# Patient Record
Sex: MALE | Race: WHITE | Employment: FULL TIME | URBAN - METROPOLITAN AREA
[De-identification: names, ages, dates, MRNs, and addresses within clinical notes are randomized per-mention and may not be internally consistent; named-entity substitution may affect disease eponyms.]

---

## 2017-10-17 ENCOUNTER — OFFICE VISIT (OUTPATIENT)
Dept: UROLOGY | Facility: CLINIC | Age: 57
End: 2017-10-17
Payer: COMMERCIAL

## 2017-10-17 ENCOUNTER — TELEPHONE (OUTPATIENT)
Dept: UROLOGY | Facility: CLINIC | Age: 57
End: 2017-10-17

## 2017-10-17 DIAGNOSIS — N41.0 ACUTE PROSTATITIS: Primary | ICD-10-CM

## 2017-10-17 PROCEDURE — 99214 OFFICE O/P EST MOD 30 MIN: CPT | Mod: 25 | Performed by: UROLOGY

## 2017-10-17 PROCEDURE — 51798 US URINE CAPACITY MEASURE: CPT | Performed by: UROLOGY

## 2017-10-17 RX ORDER — CIPROFLOXACIN 500 MG/1
500 TABLET, FILM COATED ORAL 2 TIMES DAILY
Qty: 60 TABLET | Refills: 0 | Status: SHIPPED | OUTPATIENT
Start: 2017-10-17 | End: 2018-11-28

## 2017-10-17 NOTE — PROGRESS NOTES
Chief Complaint   Patient presents with     Benign Prostatic Hypertrophy       Deandre Tao is a 57 year old male who presents today for follow up of   Chief Complaint   Patient presents with     Benign Prostatic Hypertrophy    patient c/o frequency, dysuria, low grade fever for the last several days.  He was seen by his primary and received cipro for the last several days.  His UA showed many wbc/hpf but urine culture was not done.  Today's UA however looked normal.  He has h/o BPH and has been on flomax for a long time.   He had bladder scan today that showed residual urine of 50 ml.    Current Outpatient Prescriptions   Medication Sig Dispense Refill     ciprofloxacin (CIPRO) 500 MG tablet Take 1 tablet (500 mg) by mouth 2 times daily 60 tablet 0     tamsulosin (FLOMAX) 0.4 MG 24 hr capsule Take 1 capsule (0.4 mg) by mouth daily 90 capsule 3     sildenafil (VIAGRA) 100 MG tablet Take 1 tablet (100 mg) by mouth daily as needed for erectile dysfunction 6 tablet 3     clopidogrel (PLAVIX) 75 MG tablet Take 75 mg by mouth       fluticasone (FLONASE) 50 MCG/ACT nasal spray INHALE 1 SPRAY IN EACH NOSTRIL EVERY DAY.       omeprazole (PRILOSEC) 20 MG capsule Take 20 mg by mouth       nitroglycerin (NITROSTAT) 0.4 MG SL tablet        order for DME Auto CPAP 6 cmH20 1 Units 0     tadalafil (CIALIS) 5 MG tablet Take 1 tablet (5 mg) by mouth as needed for erectile dysfunction 30 tablet 0     ATORVASTATIN CALCIUM PO Take 20 mg by mouth       aspirin 81 MG tablet Take 1 tablet by mouth daily Take one tablet daily       propranolol (INDERAL) 40 MG tablet Take 40 mg by mouth 2 times daily Take 40 mg twice daily       fenofibrate 54 MG tablet Take 54 mg by mouth daily Take one tablet daily       buPROPion (WELLBUTRIN XL) 300 MG 24 hr tablet Take 300 mg by mouth every morning.       Allergies   Allergen Reactions     Dust Mites Shortness Of Breath     Feathers Shortness Of Breath      No past medical history on file.  Past  Surgical History:   Procedure Laterality Date     HEART CATH PFO CLOSURE  2011     ORTHOPEDIC SURGERY  2009    arthroscopy right knee     Family History   Problem Relation Age of Onset     DIABETES Father      DIABETES Brother      Coronary Artery Disease No family hx of      Colon Cancer No family hx of      Social History     Social History     Marital status:      Spouse name: N/A     Number of children: N/A     Years of education: N/A     Occupational History       [employer]     Social History Main Topics     Smoking status: Never Smoker     Smokeless tobacco: Never Used     Alcohol use No      Comment: very little     Drug use: No     Sexual activity: Not Asked     Other Topics Concern     None     Social History Narrative       REVIEW OF SYSTEMS  =================  C: NEGATIVE for fever, chills, change in weight  I: NEGATIVE for worrisome rashes, moles or lesions  E/M: NEGATIVE for ear, mouth and throat problems  R: NEGATIVE for significant cough or SHORTNESS OF BREATH,   CV: NEGATIVE for chest pain, palpitations or peripheral edema  GI: NEGATIVE for nausea, abdominal pain, heartburn, or change in bowel habits  NEURO: NEGATIVE any motor/sensory changes  PSYCH: NEGATIVE for recent mood disorder    Physical Exam:  There were no vitals taken for this visit.   Patient is pleasant, in no acute distress, good general condition.  Lung: no evidence of respiratory distress    Abdomen: Soft, nondistended, non tender. No masses. No rebound or guarding.   Exam: no cvca tenderness  Skin: Warm and dry.  No redness.  Psych: normal mood and affect  Neuro: alert and oriented    Assessment/Plan:   (N41.0) Acute prostatitis  (primary encounter diagnosis)  Comment:    Plan: discussed pathophysiology of infection           Cont with cipro for one more month           Cont with flomax            Ok to take ibuprofen            Discussed Azo to help with symptoms           F/u in one month for re-exam             Discussed probiotics while on cipro

## 2017-10-17 NOTE — MR AVS SNAPSHOT
After Visit Summary   10/17/2017    Deandre Tao    MRN: [id number]           Patient Information     Date Of Birth          1960        Visit Information        Provider Department      10/17/2017 2:30 PM He Hensley MD [facility]        Today's Diagnoses     Acute prostatitis    -  1       Follow-ups after your visit        Who to contact     If you have questions or need follow up information about today's clinic visit or your schedule please contact [facility] directly at [phone].  Normal or non-critical lab and imaging results will be communicated to you by [employer]shart, letter or phone within 4 business days after the clinic has received the results. If you do not hear from us within 7 days, please contact the clinic through TrekkSoftt or phone. If you have a critical or abnormal lab result, we will notify you by phone as soon as possible.  Submit refill requests through RMI or call your pharmacy and they will forward the refill request to us. Please allow 3 business days for your refill to be completed.          Additional Information About Your Visit        MyChart Information     RMI gives you secure access to your electronic health record. If you see a primary care provider, you can also send messages to your care team and make appointments. If you have questions, please call your primary care clinic.  If you do not have a primary care provider, please call [phone] and they will assist you.        Care EveryWhere ID     This is your Care EveryWhere ID. This could be used by other organizations to access your [place] medical records  [id number]         Blood Pressure from Last 3 Encounters:   10/25/16 118/72   10/04/16 131/85   09/02/15 136/85    Weight from Last 3 Encounters:   10/04/16 89.8 kg (198 lb)   09/02/15 95.3 kg (210 lb)   07/22/14 92.4 kg (203 lb 12.8 oz)              Today, you had the following     No orders found for  display         Today's Medication Changes          These changes are accurate as of: 10/17/17  2:56 PM.  If you have any questions, ask your nurse or doctor.               Start taking these medicines.        Dose/Directions    ciprofloxacin 500 MG tablet   Commonly known as:  CIPRO   Used for:  Acute prostatitis   Started by:  He Hensley MD        Dose:  500 mg   Take 1 tablet (500 mg) by mouth 2 times daily   Quantity:  60 tablet   Refills:  0            Where to get your medicines      These medications were sent [facility] [email or web address] [address] [address]  [address] [facility] [address]    Hours:  24-hours Phone:  [phone]     ciprofloxacin 500 MG tablet                Primary Care Provider Office Phone # Fax #    [place] [facility] [phone] [address] [facility] [address]        Equal Access to Services     KIA IRWIN : Hadii graham crenshaw hadasho Soomaali, waaxda luqadaha, qaybta kaalmada adeegyada, waxay sandrain haycamillan cheikh wyatt . So [facility] [phone].    ATENCIÓN: Si habla español, tiene a kenny disposición servicios gratuitos de asistencia lingüística. [facility] [phone].    We comply with applicable federal civil rights laws and [place] laws. We do not discriminate on the basis of race, color, national origin, age, disability, sex, sexual orientation, or gender identity.            Thank you!     Thank you for choosing [facility]  for your care. Our goal is always to provide you with excellent care. Hearing back from our patients is one way we can continue to improve our services. Please take a few minutes to complete the written survey that you may receive in the mail after your visit with us. Thank you!             Your Updated Medication List - Protect others around you: Learn how to safely use, store and throw away your medicines at  www.disposemymeds.org.          This list is accurate as of: 10/17/17  2:56 PM.  Always use your most recent med list.                   Brand Name Dispense Instructions for use Diagnosis    aspirin 81 MG tablet      Take 1 tablet by mouth daily Take one tablet daily        ATORVASTATIN CALCIUM PO      Take 20 mg by mouth        buPROPion 300 MG 24 hr tablet    WELLBUTRIN XL     Take 300 mg by mouth every morning.        ciprofloxacin 500 MG tablet    CIPRO    60 tablet    Take 1 tablet (500 mg) by mouth 2 times daily    Acute prostatitis       clopidogrel 75 MG tablet    PLAVIX     Take 75 mg by mouth        fenofibrate 54 MG tablet      Take 54 mg by mouth daily Take one tablet daily        fluticasone 50 MCG/ACT spray    FLONASE     INHALE 1 SPRAY IN EACH NOSTRIL EVERY DAY.        NITROSTAT 0.4 MG sublingual tablet   Generic drug:  nitroGLYcerin           omeprazole 20 MG CR capsule    priLOSEC     Take 20 mg by mouth        order for DME     1 Units    Auto CPAP 6 cmH20    GIANLUCA (obstructive sleep apnea)       propranolol 40 MG tablet    INDERAL     Take 40 mg by mouth 2 times daily Take 40 mg twice daily        sildenafil 100 MG tablet    VIAGRA    6 tablet    Take 1 tablet (100 mg) by mouth daily as needed for erectile dysfunction    Benign non-nodular prostatic hyperplasia with lower urinary tract symptoms       tadalafil 5 MG tablet    CIALIS    30 tablet    Take 1 tablet (5 mg) by mouth as needed for erectile dysfunction    Male impotence       tamsulosin 0.4 MG capsule    FLOMAX    90 capsule    Take 1 capsule (0.4 mg) by mouth daily    Benign non-nodular prostatic hyperplasia with lower urinary tract symptoms

## 2017-10-17 NOTE — TELEPHONE ENCOUNTER
Reason for Call:  Other returning call    Detailed comments: patient retuning call. Please contact patient on the home phone    Phone Number Patient can be reached at: Home number on file [phone] (home)    Best Time: any time    Can we leave a detailed message on this number? YES    Call taken on 10/17/2017 at 1:00 PM by Stella Fuentes

## 2017-10-17 NOTE — TELEPHONE ENCOUNTER
Per Dr. Beal? At [facility], patient needs to be seen to evaluate possible urine retention and follow up for recent UTI. I left a message on patient home number to call. The cell number we had listed in his chart is wrong so we need to get it updated.   Patient can be seen this afternoon in [place] or on Thursday in [place].   Debi Mace, CMA

## 2017-10-31 ENCOUNTER — OFFICE VISIT (OUTPATIENT)
Dept: SLEEP MEDICINE | Facility: CLINIC | Age: 57
End: 2017-10-31
Payer: COMMERCIAL

## 2017-10-31 VITALS
SYSTOLIC BLOOD PRESSURE: 114 MMHG | HEART RATE: 60 BPM | BODY MASS INDEX: 30.51 KG/M2 | WEIGHT: 206 LBS | DIASTOLIC BLOOD PRESSURE: 77 MMHG | OXYGEN SATURATION: 97 % | HEIGHT: 69 IN

## 2017-10-31 DIAGNOSIS — G47.33 OSA (OBSTRUCTIVE SLEEP APNEA): ICD-10-CM

## 2017-10-31 DIAGNOSIS — E66.09 CLASS 1 OBESITY DUE TO EXCESS CALORIES WITH SERIOUS COMORBIDITY AND BODY MASS INDEX (BMI) OF 30.0 TO 30.9 IN ADULT: Primary | Chronic | ICD-10-CM

## 2017-10-31 DIAGNOSIS — E66.811 CLASS 1 OBESITY DUE TO EXCESS CALORIES WITH SERIOUS COMORBIDITY AND BODY MASS INDEX (BMI) OF 30.0 TO 30.9 IN ADULT: Primary | Chronic | ICD-10-CM

## 2017-10-31 PROCEDURE — 99214 OFFICE O/P EST MOD 30 MIN: CPT | Performed by: INTERNAL MEDICINE

## 2017-10-31 RX ORDER — DULOXETIN HYDROCHLORIDE 30 MG/1
30 CAPSULE, DELAYED RELEASE ORAL
COMMUNITY
Start: 2017-10-13

## 2017-10-31 NOTE — MR AVS SNAPSHOT
After Visit Summary   10/31/2017    Deandre Tao    MRN: [id number]           Patient Information     Date Of Birth          1960        Visit Information        Provider Department      10/31/2017 3:40 PM Jeremy Savage MD [facility]        Today's Diagnoses     Class 1 obesity due to excess calories with serious comorbidity and body mass index (BMI) of 30.0 to 30.9 in adult    -  1    GIANLUCA (obstructive sleep apnea)          Care Instructions    Read the book Say Good Night To Insomnia     [facility] Insomnia Program      Treating Insomnia  Good sleeping habits are a key part of treatment. If needed, some medications may help you sleep better at first. Making healthy lifestyle changes and learning to relax can improve your sleep. Treating insomnia takes commitment, but trust that your efforts will pay off. Talk to your doctor before taking any medication.    Healthy Lifestyle  Your lifestyle affects your health and your sleep. Here are some healthy habits:    Keep a regular sleep schedule. Go to bed and get up at the same time each day.    Exercise regularly. It may help you reduce stress. Avoid strenuous exercise for two to four hours before bedtime.    Avoid or limit naps.    Use your bed only for sleep and sex.    Don t spend too much time in bed trying to fall asleep. If you can t fall asleep, get up and do something until you become tired and drowsy.    Avoid or limit caffeine and nicotine. They can keep you awake at night. Also avoid alcohol. It may help you fall asleep at first, but your sleep will not be restful.    Before Bedtime  To sleep better every night, try these tips:    Have a bedtime routine to let your body and mind know when it s time to sleep.    Going to bed should be relaxing so try to do only relaxing things around bedtime. Sleep will come sooner.    If your worries don t let you sleep, write them down in a diary. Then close it, and go to bed.    Make sure  the room is not too hot or too cold. If it s not dark enough, an eye mask can help. If it s noisy, try using earplugs.    Learn to Relax  Stress, anxiety, and body tension may keep you awake at night. To unwind before bedtime, try reading a book, meditation, or yoga. Also, try the following:    Deep breathing. Sit or lie back in a chair. Take a slow, deep breath. Hold it for 5 counts. Then breathe out slowly through your mouth. Keep doing this until you feel relaxed.    Imagery. Think of the last fun trip you took. In your mind, walk through the trip from start to finish. Put as much detail into the memory as you can remember. It will help you relax.    Cognitive Behavioral Treatment (CBT)  CBT is the most effective treatment for long-term insomnia. It tries to address the underlying causes of your sleep problems, including your habits and how you think about sleep.      Individual Therapy   He Miriam    [phone]     [address] [address]      Online Programs     www.[employer] (pronounced shut eye). There is a fee for this program. Enter the code  Semora  if you decide to enroll in this program.      www.sleepIO.com (pronounced sleep ee oh). There is a fee for this program. Enter the code  Semora  if you decide to enroll in this program.     Suggested Resources  Insomnia Treatment Books     Overcoming Insomnia by Jake Jolley and Naina Watters (2008)    No More Sleepless Nights by Bashir Garcia and Varsha Spencer (1996)    Say Janice to Insomnia by Justino Mcginnis (2009)    The Insomnia Workbook by Rox Rodriguez and Wes King (2009)    The Insomnia Answer by Red Varma and Shankar Solorio (2006)      Stress Management and Relaxation Books    The Relaxation and Stress Reduction Workbook by Ayala Coates, Michelle Villela and David Carney (2008)    Stress Management Workbook: Techniques and Self-Assessment Procedures by Lesley Denton and Jose ALBRIGHT  Natanael (1997)    A Mindfulness-Based Stress Reduction Workbook by Kulwinder Rodriguez and Bharati Arnett (2010)    The Complete Stress Management Workbook by Bandar Arevalo, Isai Solis and Jaden Craft (1996)    Assert Yourself by Flaquita Jane and Charles Jane (1977)    Relaxation Resources for Computer Download   These websites offer resources to help you relax. This list is for information only. [facility] is not responsible for the quality of services or the actions of any person or organization.  Progressive Muscle Relaxation (PMR):     http://www.Symcircle/progressive-muscle-relaxation-exercise.html     http://studentsupport.[facility]/counseling/resources/self-help/relaxation-and-stress-management/   Deep Breathing Exercises:    http://www.Symcircle/breathing-awareness.html     Meditation:     wwwMixamo    www.TealiumguidedSinoHubmeditation-site.[employer] You may have to pay for some of these resources.    Guided Imagery:    http://www.Symcircle/guided-imagery-scripts.html     http://Xradia/library/juxnydhigm-sqqasg-rntldjh/     Counseling / Behavioral Health  [facility]  Visit www.Pending sale to [facility] by Nextivity.org or call [phone] to find a clinic close to you.      This is not a prescription and these resources are optional. You must pay for any costs when using these resources. Please ask your insurance carrier if you can be reimbursed for these resources. If so, you are responsible for sending the needed details to your insurance carrier. These resources may also be tax deductible as medical expenses. Check with your .     These programs and publications are not affiliated in any way with [facility].    Your BMI is Body mass index is 30.42 kg/(m^2).  Weight management is a personal decision.  If you are interested in exploring weight loss strategies, the following discussion covers the approaches that may be successful. Body  mass index (BMI) is one way to tell whether you are at a healthy weight, overweight, or obese. It measures your weight in relation to your height.  A BMI of 18.5 to 24.9 is in the healthy range. A person with a BMI of 25 to 29.9 is considered overweight, and someone with a BMI of 30 or greater is considered obese. More than two-thirds of American adults are considered overweight or obese.  Being overweight or obese increases the risk for further weight gain. Excess weight may lead to heart disease and diabetes.  Creating and following plans for healthy eating and physical activity may help you improve your health.  Weight control is part of healthy lifestyle and includes exercise, emotional health, and healthy eating habits. Careful eating habits lifelong are the mainstay of weight control. Though there are significant health benefits from weight loss, long-term weight loss with diet alone may be very difficult to achieve- studies show long-term success with dietary management in less than 10% of people. Attaining a healthy weight may be especially difficult to achieve in those with severe obesity. In some cases, medications, devices and surgical management might be considered.  What can you do?  If you are overweight or obese and are interested in methods for weight loss, you should discuss this with your provider.     Consider reducing daily calorie intake by 500 calories.     Keep a food journal.     Avoiding skipping meals, consider cutting portions instead.    Diet combined with exercise helps maintain muscle while optimizing fat loss. Strength training is particularly important for building and maintaining muscle mass. Exercise helps reduce stress, increase energy, and improves fitness. Increasing exercise without diet control, however, may not burn enough calories to loose weight.       Start walking three days a week 10-20 minutes at a time    Work towards walking thirty minutes five days a week      Eventually, increase the speed of your walking for 1-2 minutes at time    In addition, we recommend that you review healthy lifestyles and methods for weight loss available through the National Institutes of Health patient information sites:  http://win.niddk.nih.gov/publications/index.htm    And look into health and wellness programs that may be available through your health insurance provider, employer, local community center, or mata club.    Weight management plan: Patient was referred to their PCP to discuss a diet and exercise plan.              Follow-ups after your visit        Follow-up notes from your care team     Return in about 1 year (around 10/31/2018) for Routine Visit.      Who to contact     If you have questions or need follow up information about today's clinic visit or your schedule please contact [facility] directly at [phone].  Normal or non-critical lab and imaging results will be communicated to you by 3Scanhart, letter or phone within 4 business days after the clinic has received the results. If you do not hear from us within 7 days, please contact the clinic through InSeT Systemst or phone. If you have a critical or abnormal lab result, we will notify you by phone as soon as possible.  Submit refill requests through Cydan or call your pharmacy and they will forward the refill request to us. Please allow 3 business days for your refill to be completed.          Additional Information About Your Visit        3ScanharOvo Cosmico Information     Cydan gives you secure access to your electronic health record. If you see a primary care provider, you can also send messages to your care team and make appointments. If you have questions, please call your primary care clinic.  If you do not have a primary care provider, please call [phone] and they will assist you.        Care EveryWhere ID     This is your Care EveryWhere ID. This could be used by other organizations to access your  "[place] medical records  [id number]        Your Vitals Were     Pulse Height Pulse Oximetry BMI (Body Mass Index)          60 1.753 m (5' 9\") 97% 30.42 kg/m2         Blood Pressure from Last 3 Encounters:   10/31/17 114/77   10/25/16 118/72   10/04/16 131/85    Weight from Last 3 Encounters:   10/31/17 93.4 kg (206 lb)   10/04/16 89.8 kg (198 lb)   09/02/15 95.3 kg (210 lb)              Today, you had the following     No orders found for display       Primary Care Provider Office Phone # Fax #    [facility] [phone] [address] [facility] [address]        Equal Access to Services     KIA IRWIN : Samy nam Soorestes, waaxda luqadaha, qaybta kaalmada adeegyada, esmer cormier. So [facility] [phone].    ATENCIÓN: Si habla español, tiene a kenny disposición servicios gratuitos de asistencia lingüística. [facility] [phone].    We comply with applicable federal civil rights laws and [place] laws. We do not discriminate on the basis of race, color, national origin, age, disability, sex, sexual orientation, or gender identity.            Thank you!     Thank you for choosing [facility]  for your care. Our goal is always to provide you with excellent care. Hearing back from our patients is one way we can continue to improve our services. Please take a few minutes to complete the written survey that you may receive in the mail after your visit with us. Thank you!             Your Updated Medication List - Protect others around you: Learn how to safely use, store and throw away your medicines at www.disposemymeds.org.          This list is accurate as of: 10/31/17  4:14 PM.  Always use your most recent med list.                   Brand Name Dispense Instructions for use Diagnosis    aspirin 81 MG tablet      Take 1 tablet by mouth daily Take one tablet daily        ATORVASTATIN CALCIUM PO      Take 20 mg by mouth "        buPROPion 300 MG 24 hr tablet    WELLBUTRIN XL     Take 300 mg by mouth every morning.        ciprofloxacin 500 MG tablet    CIPRO    60 tablet    Take 1 tablet (500 mg) by mouth 2 times daily    Acute prostatitis       clopidogrel 75 MG tablet    PLAVIX     Take 75 mg by mouth        DULoxetine 30 MG EC capsule    CYMBALTA     Take 30 mg by mouth        fenofibrate 54 MG tablet      Take 54 mg by mouth daily Take one tablet daily        fluticasone 50 MCG/ACT spray    FLONASE     INHALE 1 SPRAY IN EACH NOSTRIL EVERY DAY.        NITROSTAT 0.4 MG sublingual tablet   Generic drug:  nitroGLYcerin           omeprazole 20 MG CR capsule    priLOSEC     Take 20 mg by mouth        order for DME     1 Units    Auto CPAP 6 cmH20    GIANLUCA (obstructive sleep apnea)       propranolol 40 MG tablet    INDERAL     Take 40 mg by mouth 2 times daily Take 40 mg twice daily        sildenafil 100 MG tablet    VIAGRA    6 tablet    Take 1 tablet (100 mg) by mouth daily as needed for erectile dysfunction    Benign non-nodular prostatic hyperplasia with lower urinary tract symptoms       tadalafil 5 MG tablet    CIALIS    30 tablet    Take 1 tablet (5 mg) by mouth as needed for erectile dysfunction    Male impotence       tamsulosin 0.4 MG capsule    FLOMAX    90 capsule    Take 1 capsule (0.4 mg) by mouth daily    Benign non-nodular prostatic hyperplasia with lower urinary tract symptoms

## 2017-10-31 NOTE — PROGRESS NOTES
"Obstructive Sleep Apnea- PAP Follow-Up Visit:    Chief Complaint   Patient presents with     Study Results     CPAP Follow Up       Deandre is here for follow up of his mild GIANLUCA (AHI 7) and PAP usage for a DOT visit.      Deandre initially had a sleep study February 17, 2008 and then a CPAP titration study 2/26/2008 for snoring, morning headaches, restlessness, observed apneas.    He denies any MVAs or drowsy driving    Overall, he rates the experience with PAP as 7 (0 poor, 10 great). The mask is comfortable.    The mask is not leaking.  He is not snoring with the mask on. He is not having gasp arousals.  He is not having significant oral/nasal dryness. The pressure is not comfortable. The pressure is uncomfortable because of \"I think I need a linger ramp up\".    His PAP interface is Nasal Mask.    Bedtime is typically 2200. Usually it takes about 30 min minutes to fall asleep with the mask on. Wake time is typically 0530.  Patient is using PAP therapy 7 hours per night. The patient is usually getting 7 hours of sleep per night.    He does feel rested in the morning. He has been having trouble falling asleep because of an overactive mind. He     Total score - [place]: 0 (10/31/2017  3:00 PM)      Respironics  CPAP 6 cmH2O 30 day usage data:  97% of days with > 4 hours of use. 30/30 days with no use.   Average use 7 hours 8 minutes per day.   Average % of night in large leak 0%.    AHI 1.6 events per hour.         Past medical/surgical history, family history, social history, medications and allergies were reviewed.      Problem List:  Patient Active Problem List    Diagnosis Date Noted     Coronary artery disease involving native coronary artery of native heart 10/04/2016     Priority: High     S/p stentx4 [facility]       CVD (cerebrovascular disease) 11/02/2010     Priority: High     TIA 2010 [facility]:  flushed, light headed, numbness in left foot. S/p PFO closure in 2010 due to TIA.         GIANLUCA (obstructive sleep " "apnea)- mild (AHI 6) 09/01/2015     Priority: Medium     Sleep study [facility] 2/2008 (#206)- AHI 6.1, RDI 17.1, low 02 89%, PLMAI 16.2  Sleep study [facility] 2/2008 (#206)-  Resolution of OSAS with low level CPAP. No supine REM seen. PLMI 21.9, PLMAI 8.6       Hyperlipidemia 09/01/2015     Priority: Medium     Anxiety 09/01/2015     Priority: Medium     Depression 09/01/2015     Priority: Medium     Insomnia 09/02/2015     Priority: Low     BPH (benign prostatic hypertrophy) 09/01/2015     Priority: Low     Obesity 09/01/2015     Priority: Low        /77  Pulse 60  Ht 1.753 m (5' 9\")  Wt 93.4 kg (206 lb)  SpO2 97%  BMI 30.42 kg/m2        Impression/Plan:     Mild Sleep apnea.  Tolerating PAP well.   Patient appears to be at low risk for sleep-related driving incidents.      psychophysiologic insomnia. We discussed stimulus control and anterior. Read the book Say Good Night To Insomnia . Consider cognitive behavioral training.     Deandre Tao will follow up in about 1 year(s).     Twenty-five minutes spent with patient, all of which were spent face-to-face counseling, consulting, coordinating plan of care.      "

## 2018-06-25 DIAGNOSIS — N40.1 BENIGN NON-NODULAR PROSTATIC HYPERPLASIA WITH LOWER URINARY TRACT SYMPTOMS: ICD-10-CM

## 2018-06-25 RX ORDER — TAMSULOSIN HYDROCHLORIDE 0.4 MG/1
0.4 CAPSULE ORAL DAILY
Qty: 90 CAPSULE | Refills: 1 | Status: SHIPPED | OUTPATIENT
Start: 2018-06-25

## 2018-06-25 NOTE — TELEPHONE ENCOUNTER
Signed Prescriptions:                        Disp   Refills    tamsulosin (FLOMAX) 0.4 MG capsule         90 cap*1        Sig: Take 1 capsule (0.4 mg) by mouth daily  Authorizing Provider: EMILIANA HAMPTON  Ordering User: MIKE BYERS    Medication filled per [facility] protocol.     MIKE BYERS RN

## 2018-07-24 DIAGNOSIS — G47.33 OSA (OBSTRUCTIVE SLEEP APNEA): Primary | Chronic | ICD-10-CM

## 2018-11-28 ENCOUNTER — OFFICE VISIT (OUTPATIENT)
Dept: SLEEP MEDICINE | Facility: CLINIC | Age: 58
End: 2018-11-28
Payer: COMMERCIAL

## 2018-11-28 VITALS
SYSTOLIC BLOOD PRESSURE: 126 MMHG | OXYGEN SATURATION: 99 % | HEIGHT: 69 IN | HEART RATE: 56 BPM | WEIGHT: 192 LBS | BODY MASS INDEX: 28.44 KG/M2 | DIASTOLIC BLOOD PRESSURE: 81 MMHG

## 2018-11-28 DIAGNOSIS — G47.33 OSA (OBSTRUCTIVE SLEEP APNEA): Primary | ICD-10-CM

## 2018-11-28 DIAGNOSIS — F51.04 PSYCHOPHYSIOLOGIC INSOMNIA: ICD-10-CM

## 2018-11-28 PROCEDURE — 99213 OFFICE O/P EST LOW 20 MIN: CPT | Performed by: INTERNAL MEDICINE

## 2018-11-28 NOTE — MR AVS SNAPSHOT
After Visit Summary   11/28/2018    Deandre Tao    MRN: [id number]           Patient Information     Date Of Birth          1960        Visit Information        Provider Department      11/28/2018 2:20 PM Jeremy Savage MD [facility]        Today's Diagnoses     GIANLUCA (obstructive sleep apnea)    -  1    Psychophysiologic insomnia          Care Instructions      Your BMI is Body mass index is 28.35 kg/(m^2).  Weight management is a personal decision.  If you are interested in exploring weight loss strategies, the following discussion covers the approaches that may be successful. Body mass index (BMI) is one way to tell whether you are at a healthy weight, overweight, or obese. It measures your weight in relation to your height.  A BMI of 18.5 to 24.9 is in the healthy range. A person with a BMI of 25 to 29.9 is considered overweight, and someone with a BMI of 30 or greater is considered obese. More than two-thirds of American adults are considered overweight or obese.  Being overweight or obese increases the risk for further weight gain. Excess weight may lead to heart disease and diabetes.  Creating and following plans for healthy eating and physical activity may help you improve your health.  Weight control is part of healthy lifestyle and includes exercise, emotional health, and healthy eating habits. Careful eating habits lifelong are the mainstay of weight control. Though there are significant health benefits from weight loss, long-term weight loss with diet alone may be very difficult to achieve- studies show long-term success with dietary management in less than 10% of people. Attaining a healthy weight may be especially difficult to achieve in those with severe obesity. In some cases, medications, devices and surgical management might be considered.  What can you do?  If you are overweight or obese and are interested in methods for weight loss, you should discuss this with  your provider.     Consider reducing daily calorie intake by 500 calories.     Keep a food journal.     Avoiding skipping meals, consider cutting portions instead.    Diet combined with exercise helps maintain muscle while optimizing fat loss. Strength training is particularly important for building and maintaining muscle mass. Exercise helps reduce stress, increase energy, and improves fitness. Increasing exercise without diet control, however, may not burn enough calories to loose weight.       Start walking three days a week 10-20 minutes at a time    Work towards walking thirty minutes five days a week     Eventually, increase the speed of your walking for 1-2 minutes at time    In addition, we recommend that you review healthy lifestyles and methods for weight loss available through the National Institutes of Health patient information sites:  http://win.niddk.nih.gov/publications/index.htm    And look into health and wellness programs that may be available through your health insurance provider, employer, local community center, or mata club.    Weight management plan: Patient was referred to their PCP to discuss a diet and exercise plan.              Follow-ups after your visit        Follow-up notes from your care team     Return in about 1 month (around 12/28/2018) for Routine Visit.      Who to contact     If you have questions or need follow up information about today's clinic visit or your schedule please contact [facility] directly at [phone].  Normal or non-critical lab and imaging results will be communicated to you by MyChart, letter or phone within 4 business days after the clinic has received the results. If you do not hear from us within 7 days, please contact the clinic through ZAL[employer] or phone. If you have a critical or abnormal lab result, we will notify you by phone as soon as possible.  Submit refill requests through [employer] or call your pharmacy and they will forward  "the refill request to us. Please allow 3 business days for your refill to be completed.          Additional Information About Your Visit        sentitO Networkshart Information     mAPPn gives you secure access to your electronic health record. If you see a primary care provider, you can also send messages to your care team and make appointments. If you have questions, please call your primary care clinic.  If you do not have a primary care provider, please call [phone] and they will assist you.        Care EveryWhere ID     This is your Care EveryWhere ID. This could be used by other organizations to access your [facility] medical records  [id number]        Your Vitals Were     Pulse Height Pulse Oximetry BMI (Body Mass Index)          56 1.753 m (5' 9\") 99% 28.35 kg/m2         Blood Pressure from Last 3 Encounters:   11/28/18 126/81   10/31/17 114/77   10/25/16 118/72    Weight from Last 3 Encounters:   11/28/18 87.1 kg (192 lb)   10/31/17 93.4 kg (206 lb)   10/04/16 89.8 kg (198 lb)              We Performed the Following     Comprehensive DME        Primary Care Provider Office Phone # Fax #    Yanelis [facility] [phone] [facility] [address]        Equal Access to Services     KIA IRWIN : Hadii aad ku hadasho Soomaali, waaxda luqadaha, qaybta kaalmada adeegyada, esmer cormier. So [facility] [phone].    ATENCIÓN: Si habla español, tiene a kenny disposición servicios gratuitos de asistencia lingüística. ame al [phone].    We comply with applicable federal civil rights laws and [place] laws. We do not discriminate on the basis of race, color, national origin, age, disability, sex, sexual orientation, or gender identity.            Thank you!     Thank you for choosing [facility]  for your care. Our goal is always to provide you with excellent care. Hearing back from our patients is one way we can continue to " improve our services. Please take a few minutes to complete the written survey that you may receive in the mail after your visit with us. Thank you!             Your Updated Medication List - Protect others around you: Learn how to safely use, store and throw away your medicines at www.disposemymeds.org.          This list is accurate as of 11/28/18  3:04 PM.  Always use your most recent med list.                   Brand Name Dispense Instructions for use Diagnosis    aspirin 81 MG tablet    ASA     Take 1 tablet by mouth daily Take one tablet daily        ATORVASTATIN CALCIUM PO      Take 20 mg by mouth        buPROPion 300 MG 24 hr tablet    WELLBUTRIN XL     Take 300 mg by mouth every morning.        DULoxetine 30 MG capsule    CYMBALTA     Take 30 mg by mouth        fenofibrate 54 MG tablet      Take 54 mg by mouth daily Take one tablet daily        fluticasone 50 MCG/ACT nasal spray    FLONASE     INHALE 1 SPRAY IN EACH NOSTRIL EVERY DAY.        NITROSTAT 0.4 MG sublingual tablet   Generic drug:  nitroGLYcerin           omeprazole 20 MG DR capsule    priLOSEC     Take 20 mg by mouth        order for DME     1 Units    Auto CPAP 6 cmH20    GIANLUCA (obstructive sleep apnea)       propranolol 40 MG tablet    INDERAL     Take 40 mg by mouth 2 times daily Take 40 mg twice daily        sildenafil 100 MG tablet    VIAGRA    6 tablet    Take 1 tablet (100 mg) by mouth daily as needed for erectile dysfunction    Benign non-nodular prostatic hyperplasia with lower urinary tract symptoms       tadalafil 5 MG tablet    CIALIS    30 tablet    Take 1 tablet (5 mg) by mouth as needed for erectile dysfunction    Male impotence       tamsulosin 0.4 MG capsule    FLOMAX    90 capsule    Take 1 capsule (0.4 mg) by mouth daily    Benign non-nodular prostatic hyperplasia with lower urinary tract symptoms

## 2018-11-28 NOTE — PROGRESS NOTES
Obstructive Sleep Apnea - PAP Follow-Up Visit:    Chief Complaint   Patient presents with     CPAP Follow Up         Deandre is here for follow up of his mild GIANLUCA (AHI 7) and PAP usage for a DOT visit.      Deandre initially had a sleep study February 17, 2008 and then a CPAP titration study 2/26/2008 for snoring, morning headaches, restlessness, observed apneas. Weight at the time of study was 206#.    He denies any MVAs or drowsy driving    He reports his machine stopped working 2 weeks ago. The power cord had to be wiggled, now that doesn't work.     Overall, he rates the experience with PAP as 0 (0 poor, 10 great). The mask is comfortable.    The mask is not leaking .  He is not snoring with the mask on. He is not having gasp arousals.  He is not having significant oral/nasal dryness. The pressure is comfortable.     His PAP interface is Nasal Mask.    Bedtime is typically 2200. Usually it takes about 30 min minutes to fall asleep with the mask on. Wake time is typically 0515.  Patient is using PAP therapy 7 hours per night. The patient is usually getting 6 hours of sleep per night.    He does feel rested in the morning.    Total score - [place]: 0 (11/28/2018  2:00 PM)  MARIAM Total Score: 12    Power cord not working. He called 3 places and was unable to get a replacement        Past medical/surgical history, family history, social history, medications and allergies were reviewed.      Problem List:  Patient Active Problem List    Diagnosis Date Noted     Coronary artery disease involving native coronary artery of native heart 10/04/2016     Priority: High     S/p stentx4 [facility]       CVD (cerebrovascular disease) 11/02/2010     Priority: High     TIA 2010 [facility]:  flushed, light headed, numbness in left foot. S/p PFO closure in 2010 due to TIA.         GIANLUCA (obstructive sleep apnea)- mild (AHI 6) 09/01/2015     Priority: Medium     Sleep study [facility] 2/2008 (#206)- AHI 6.1, RDI 17.1, low 02 89%, PLMAI  "16.2  Sleep study [facility] 2/2008 (#206)-  Resolution of OSAS with low level CPAP. No supine REM seen. PLMI 21.9, PLMAI 8.6       Hyperlipidemia 09/01/2015     Priority: Medium     Anxiety 09/01/2015     Priority: Medium     Depression 09/01/2015     Priority: Medium     Insomnia 09/02/2015     Priority: Low     BPH (benign prostatic hypertrophy) 09/01/2015     Priority: Low     Class 1 obesity due to excess calories with serious comorbidity in adult 09/01/2015     Priority: Low        /81  Pulse 56  Ht 1.753 m (5' 9\")  Wt 87.1 kg (192 lb)  SpO2 99%  BMI 28.35 kg/m2    Impression/Plan:    Mild Sleep apnea. Power cord malfunction, unable to replace  - Rx for replacement machine    Jeremy Savage     Fifteen minutes spent with patient, all of which were spent face-to-face counseling, consulting, coordinating plan of care.        "

## 2018-11-28 NOTE — NURSING NOTE
"Chief Complaint   Patient presents with     CPAP Follow Up       Initial /81  Pulse 56  Ht 1.753 m (5' 9\")  Wt 87.1 kg (192 lb)  SpO2 99%  BMI 28.35 kg/m2 Estimated body mass index is 28.35 kg/(m^2) as calculated from the following:    Height as of this encounter: 1.753 m (5' 9\").    Weight as of this encounter: 87.1 kg (192 lb).    Medication Reconciliation: complete      "

## 2018-11-28 NOTE — PATIENT INSTRUCTIONS

## 2018-12-04 ENCOUNTER — TELEPHONE (OUTPATIENT)
Dept: SLEEP MEDICINE | Facility: CLINIC | Age: 58
End: 2018-12-04

## 2018-12-04 DIAGNOSIS — F51.04 PSYCHOPHYSIOLOGIC INSOMNIA: ICD-10-CM

## 2018-12-04 DIAGNOSIS — E66.811 CLASS 1 OBESITY DUE TO EXCESS CALORIES WITH SERIOUS COMORBIDITY IN ADULT: ICD-10-CM

## 2018-12-04 DIAGNOSIS — E66.09 CLASS 1 OBESITY DUE TO EXCESS CALORIES WITH SERIOUS COMORBIDITY IN ADULT: ICD-10-CM

## 2018-12-04 DIAGNOSIS — G47.33 OSA (OBSTRUCTIVE SLEEP APNEA): ICD-10-CM

## 2018-12-04 NOTE — TELEPHONE ENCOUNTER
Left patient a voicemail to call [facility] to schedule CPAP replacement setup appointment.

## 2018-12-10 ENCOUNTER — DOCUMENTATION ONLY (OUTPATIENT)
Dept: SLEEP MEDICINE | Facility: CLINIC | Age: 58
End: 2018-12-10

## 2018-12-10 DIAGNOSIS — F51.04 PSYCHOPHYSIOLOGIC INSOMNIA: ICD-10-CM

## 2018-12-10 DIAGNOSIS — G47.33 OSA (OBSTRUCTIVE SLEEP APNEA): ICD-10-CM

## 2018-12-10 NOTE — PROGRESS NOTES
Patient was offered choice of vendor and chose [facility].  Patient Deandre Tao was set up at [facility] on December 10, 2018. Patient received a Antwan [employer]pir[employer] DreamStation CPAP. Pressures were set at 6 cm H2O.   Patient s ramp is 5 cm H2O for 20 min and FLEX/EPR is C Flex, 2.  Patient received heated tubing and heated humidifier.  Patient is not enrolled in the STM Program and does not need to meet compliance.     Danika Lora

## 2018-12-27 DIAGNOSIS — N40.1 BENIGN NON-NODULAR PROSTATIC HYPERPLASIA WITH LOWER URINARY TRACT SYMPTOMS: ICD-10-CM

## 2018-12-28 RX ORDER — TAMSULOSIN HYDROCHLORIDE 0.4 MG/1
0.4 CAPSULE ORAL DAILY
Qty: 90 CAPSULE | Refills: 1 | OUTPATIENT
Start: 2018-12-28

## 2018-12-28 NOTE — TELEPHONE ENCOUNTER
Pending Prescriptions:                       Disp   Refills    tamsulosin (FLOMAX) 0.4 MG capsule        90 cap*1            Sig: Take 1 capsule (0.4 mg) by mouth daily    Last Written Prescription Date:  6/25/2018  Last Fill Quantity: 90,   # refills: 1   Last Office Visit: 10/17/2017  Future Office visit:       Routing refill request to provider for review/approval because:  Patient has not been seen for over 1 year.    Kevin Lindsey RN....12/28/2018 9:38 AM

## 2019-01-02 NOTE — TELEPHONE ENCOUNTER
Called and spoke to patient.   Patient is seeing a different urologist and will request a refill from them.   Pamella Solomon RN

## 2019-04-18 ENCOUNTER — OFFICE VISIT (OUTPATIENT)
Dept: SLEEP MEDICINE | Facility: CLINIC | Age: 59
End: 2019-04-18
Payer: COMMERCIAL

## 2019-04-18 VITALS
WEIGHT: 208 LBS | DIASTOLIC BLOOD PRESSURE: 83 MMHG | OXYGEN SATURATION: 96 % | HEART RATE: 72 BPM | HEIGHT: 69 IN | SYSTOLIC BLOOD PRESSURE: 124 MMHG | BODY MASS INDEX: 30.81 KG/M2

## 2019-04-18 DIAGNOSIS — G47.33 OSA (OBSTRUCTIVE SLEEP APNEA): Primary | ICD-10-CM

## 2019-04-18 DIAGNOSIS — F51.04 PSYCHOPHYSIOLOGIC INSOMNIA: ICD-10-CM

## 2019-04-18 DIAGNOSIS — E66.09 CLASS 1 OBESITY DUE TO EXCESS CALORIES WITH SERIOUS COMORBIDITY AND BODY MASS INDEX (BMI) OF 30.0 TO 30.9 IN ADULT: Chronic | ICD-10-CM

## 2019-04-18 DIAGNOSIS — E66.811 CLASS 1 OBESITY DUE TO EXCESS CALORIES WITH SERIOUS COMORBIDITY AND BODY MASS INDEX (BMI) OF 30.0 TO 30.9 IN ADULT: Chronic | ICD-10-CM

## 2019-04-18 PROCEDURE — 99213 OFFICE O/P EST LOW 20 MIN: CPT | Performed by: INTERNAL MEDICINE

## 2019-04-18 ASSESSMENT — MIFFLIN-ST. JEOR: SCORE: 1758.86

## 2019-04-18 NOTE — PATIENT INSTRUCTIONS
Your BMI is Body mass index is 30.72 kg/m .  Weight management is a personal decision.  If you are interested in exploring weight loss strategies, the following discussion covers the approaches that may be successful. Body mass index (BMI) is one way to tell whether you are at a healthy weight, overweight, or obese. It measures your weight in relation to your height.  A BMI of 18.5 to 24.9 is in the healthy range. A person with a BMI of 25 to 29.9 is considered overweight, and someone with a BMI of 30 or greater is considered obese. More than two-thirds of American adults are considered overweight or obese.  Being overweight or obese increases the risk for further weight gain. Excess weight may lead to heart disease and diabetes.  Creating and following plans for healthy eating and physical activity may help you improve your health.  Weight control is part of healthy lifestyle and includes exercise, emotional health, and healthy eating habits. Careful eating habits lifelong are the mainstay of weight control. Though there are significant health benefits from weight loss, long-term weight loss with diet alone may be very difficult to achieve- studies show long-term success with dietary management in less than 10% of people. Attaining a healthy weight may be especially difficult to achieve in those with severe obesity. In some cases, medications, devices and surgical management might be considered.  What can you do?  If you are overweight or obese and are interested in methods for weight loss, you should discuss this with your provider.     Consider reducing daily calorie intake by 500 calories.     Keep a food journal.     Avoiding skipping meals, consider cutting portions instead.    Diet combined with exercise helps maintain muscle while optimizing fat loss. Strength training is particularly important for building and maintaining muscle mass. Exercise helps reduce stress, increase energy, and improves fitness.  Increasing exercise without diet control, however, may not burn enough calories to loose weight.       Start walking three days a week 10-20 minutes at a time    Work towards walking thirty minutes five days a week     Eventually, increase the speed of your walking for 1-2 minutes at time    In addition, we recommend that you review healthy lifestyles and methods for weight loss available through the National Institutes of Health patient information sites:  http://win.niddk.nih.gov/publications/index.htm    And look into health and wellness programs that may be available through your health insurance provider, employer, local community center, or mata club.    Weight management plan: Patient was referred to their PCP to discuss a diet and exercise plan.

## 2019-04-18 NOTE — NURSING NOTE
"Chief Complaint   Patient presents with     CPAP Follow Up       Initial /83   Pulse 72   Ht 1.753 m (5' 9\")   Wt 94.3 kg (208 lb)   SpO2 96%   BMI 30.72 kg/m   Estimated body mass index is 30.72 kg/m  as calculated from the following:    Height as of this encounter: 1.753 m (5' 9\").    Weight as of this encounter: 94.3 kg (208 lb).    Medication Reconciliation: complete      "

## 2019-04-18 NOTE — PROGRESS NOTES
Obstructive Sleep Apnea - PAP Follow-Up Visit:    Chief Complaint   Patient presents with     CPAP Follow Up       Deandre Tao comes in today for follow-up of their mild sleep apnea, managed with CPAP.     Deandre is here for follow up of his mild GIANLUCA (AHI 7) and PAP usage for a DOT visit.      Deandre initially had a sleep study February 17, 2008 and then a CPAP titration study 2/26/2008 for snoring, morning headaches, restlessness, observed apneas. Weight at the time of study was 206#.    Overall, he rates the experience with PAP as 9 (0 poor, 10 great). The mask is comfortable.    The mask is leaking at his bottom of nose area.  The mask is leaking 2 nights per week.  He is not snoring with the mask on. He is not having gasp arousals.  He is not having significant oral/nasal dryness. The pressure is comfortable.     His PAP interface is Nasal Mask.    Bedtime is typically 2200. Usually it takes about 30 min minutes to fall asleep with the mask on. Wake time is typically 0530.  Patient is using PAP therapy 6 hours per night. The patient is usually getting 6 hours of sleep per night.    He does not feel rested in the morning.    Total score - [place]: 2 (4/18/2019  2:00 PM)  MARIAM Total Score: 14    He denies any drowsy driving. He has not had any motor vehicle accidents.        Respironics  CPAP 6 cmH2O 30 day usage data:  90% of days with > 4 hours of use. 1/30 days with no use.   Average use 6 hours 8 minutes per day.   AHI 0.6 events per hour.           Past medical/surgical history, family history, social history, medications and allergies were reviewed.      Problem List:  Patient Active Problem List    Diagnosis Date Noted     Coronary artery disease involving native coronary artery of native heart 10/04/2016     Priority: Medium     S/p stentx4 [facility]       Psychophysiologic insomnia 09/02/2015     Priority: Medium     GIANLUCA (obstructive sleep apnea)- mild (AHI 6) 09/01/2015     Priority: Medium     Sleep study  "[facility] 2/2008 (#206)- AHI 6.1, RDI 17.1, low 02 89%, PLMAI 16.2  Sleep study [facility] 2/2008 (#206)-  Resolution of OSAS with low level CPAP. No supine REM seen. PLMI 21.9, PLMAI 8.6       BPH (benign prostatic hypertrophy) 09/01/2015     Priority: Medium     Hyperlipidemia 09/01/2015     Priority: Medium     Anxiety 09/01/2015     Priority: Medium     Depression 09/01/2015     Priority: Medium     CVD (cerebrovascular disease) 11/02/2010     Priority: Medium     TIA 2010 [facility]:  flushed, light headed, numbness in left foot. S/p PFO closure in 2010 due to TIA.         Class 1 obesity due to excess calories with serious comorbidity in adult 09/01/2015     Priority: Low        /83   Pulse 72   Ht 1.753 m (5' 9\")   Wt 94.3 kg (208 lb)   SpO2 96%   BMI 30.72 kg/m      Impression/Plan:     Mild Sleep apnea.  Tolerating PAP well. Daytime symptoms are improved.   Continue current treatments.  Patient appears to be at low risk for sleep-related driving incidents.     Sleep onset difficulties due psychophysiologic insomnia  Discussed stimulus control, regular wake times    Deandre Tao will follow up in about 1 year(s).     Fifteen minutes spent with patient, all of which were spent face-to-face counseling, consulting, coordinating plan of care.      "

## 2019-09-12 ENCOUNTER — APPOINTMENT (OUTPATIENT)
Age: 59
Setting detail: DERMATOLOGY
End: 2019-09-12

## 2019-09-12 VITALS — RESPIRATION RATE: 16 BRPM | HEIGHT: 69 IN | WEIGHT: 210 LBS

## 2019-09-12 DIAGNOSIS — D22 MELANOCYTIC NEVI: ICD-10-CM

## 2019-09-12 DIAGNOSIS — L73.8 OTHER SPECIFIED FOLLICULAR DISORDERS: ICD-10-CM

## 2019-09-12 DIAGNOSIS — Z87.2 PERSONAL HISTORY OF DISEASES OF THE SKIN AND SUBCUTANEOUS TISSUE: ICD-10-CM

## 2019-09-12 DIAGNOSIS — L82.1 OTHER SEBORRHEIC KERATOSIS: ICD-10-CM

## 2019-09-12 DIAGNOSIS — L91.8 OTHER HYPERTROPHIC DISORDERS OF THE SKIN: ICD-10-CM

## 2019-09-12 DIAGNOSIS — L81.4 OTHER MELANIN HYPERPIGMENTATION: ICD-10-CM

## 2019-09-12 PROBLEM — D48.5 NEOPLASM OF UNCERTAIN BEHAVIOR OF SKIN: Status: ACTIVE | Noted: 2019-09-12

## 2019-09-12 PROBLEM — D22.62 MELANOCYTIC NEVI OF LEFT UPPER LIMB, INCLUDING SHOULDER: Status: ACTIVE | Noted: 2019-09-12

## 2019-09-12 PROCEDURE — 11103 TANGNTL BX SKIN EA SEP/ADDL: CPT

## 2019-09-12 PROCEDURE — OTHER COUNSELING: OTHER

## 2019-09-12 PROCEDURE — OTHER PATHOLOGY BILLING: OTHER

## 2019-09-12 PROCEDURE — 99214 OFFICE O/P EST MOD 30 MIN: CPT | Mod: 25

## 2019-09-12 PROCEDURE — OTHER BIOPSY BY SHAVE METHOD: OTHER

## 2019-09-12 PROCEDURE — 11102 TANGNTL BX SKIN SINGLE LES: CPT

## 2019-09-12 PROCEDURE — 88305 TISSUE EXAM BY PATHOLOGIST: CPT

## 2019-09-12 ASSESSMENT — LOCATION SIMPLE DESCRIPTION DERM
LOCATION SIMPLE: LEFT UPPER ARM
LOCATION SIMPLE: LEFT FOREARM
LOCATION SIMPLE: RIGHT CHEEK
LOCATION SIMPLE: RIGHT ANTERIOR NECK
LOCATION SIMPLE: LEFT UPPER BACK
LOCATION SIMPLE: LOWER BACK
LOCATION SIMPLE: LEFT ANTERIOR NECK
LOCATION SIMPLE: ABDOMEN

## 2019-09-12 ASSESSMENT — LOCATION ZONE DERM
LOCATION ZONE: TRUNK
LOCATION ZONE: ARM
LOCATION ZONE: FACE
LOCATION ZONE: NECK

## 2019-09-12 ASSESSMENT — LOCATION DETAILED DESCRIPTION DERM
LOCATION DETAILED: PERIUMBILICAL SKIN
LOCATION DETAILED: RIGHT CENTRAL MALAR CHEEK
LOCATION DETAILED: LEFT ANTERIOR PROXIMAL UPPER ARM
LOCATION DETAILED: RIGHT CLAVICULAR NECK
LOCATION DETAILED: LEFT CLAVICULAR NECK
LOCATION DETAILED: LEFT SUPERIOR LATERAL UPPER BACK
LOCATION DETAILED: INFERIOR LUMBAR SPINE
LOCATION DETAILED: LEFT PROXIMAL DORSAL FOREARM

## 2019-09-12 NOTE — PROCEDURE: BIOPSY BY SHAVE METHOD
Was A Bandage Applied: Yes
Curettage Text: The wound bed was treated with curettage after the biopsy was performed.
Silver Nitrate Text: The wound bed was treated with silver nitrate after the biopsy was performed.
Cryotherapy Text: The wound bed was treated with cryotherapy after the biopsy was performed.
Notification Instructions: - It can take up to 2 weeks to get a biopsy result. \\n- Please refrain from calling to request results until after 2 weeks.
Size Of Lesion In Cm: 0
Bill For Surgical Tray: no
Depth Of Biopsy: dermis
Billing Type: Client Bill
Detail Level: Detailed
Type Of Destruction Used: Curettage
Electrodesiccation And Curettage Text: The wound bed was treated with electrodesiccation and curettage after the biopsy was performed.
Body Location Override (Optional - Billing Will Still Be Based On Selected Body Map Location If Applicable): L periumbilical
Anesthesia Type: 2% lidocaine with epinephrine
Post-Care Instructions: WOUND CARE:\\nDo NOT submerge wound in a bath, swimming pool, or hot tub for at least 1 week or for as long as there is an open wound. Gently cleanse the site daily with mild soap and water. Be careful NOT to allow a forceful stream of water to hit the biopsy site. After cleaning/showering, apply a thin layer of petrolatum ointment or Aquaphor in the wound followed by an adhesive bandage. Continue this process daily until healed. \\n\\nBLEEDING:\\nIf you develop persistent bleeding, apply firm and steady pressure over the dressing with gauze for 15 minutes. If bleeding persists, reapply pressure with an ice pack over the gauze for 15 minutes. NEVER APPLY ICE DIRECTLY TO THE SKIN. Do NOT peek under the gauze during these 15 minutes of pressure.  Call our office at [phone] if you cannot get the bleeding to stop. \\n\\nINFECTION:\\nSigns of infection may include increasing rather than decreasing pain (after the first few days), increasing redness/swelling/heat, draining pus, pink/red streaks around the wound, and fever or chills.  Please call our office immediately at [phone] if infection is suspected. It is normal to have some mild redness on or around the wound edges; this will lighten day by day and will become less tender.\\n\\nPAIN:\\nPain is usually minimal, but if needed you may take acetaminophen (Tylenol) every four hours as needed. Applying an ice pack over the dressing for 15-20 minutes every 2-3 hours will relieve swelling, lessen pain, and help minimize bruising. NEVER APPLY ICE DIRECTLY TO THE SKIN. Avoid ibuprofen (Advil, Motrin) and naproxen (Aleve) for the first 48 hours as these can increase bleeding.\\n\\nSWELLIG AND BRUISING:\\nSwelling and bruising are common and temporary, usually lasting 1 - 2 weeks. It is more common in areas treated around the eyes, hands, and feet. In the days following the procedure, swelling and bruising can be minimized by keeping the affected areas elevated when possible, reducing salty foods, and applying ice packs over the dressing for 15-20 minutes every 2-3 hours. NEVER APPLY ICE DIRECTLY TO THE SKIN.\\n\\nITCHING:\\nItchiness on and around the wound is very common and can last several days to weeks after surgery. Mild itch is normal as the wound is healing. \\n\\nNERVE CHANGES:\\nNumbness is usually temporary, but it may last for several weeks to months. You may also experience sharp pains at the wound sight as it heals.  Mild pain is normal and will gradually improve with time.\\n \\nNO SMOKING:\\nSmoking will delay the healing process. If you smoke, we recommend trying to quit or at minimum reduce how much you smoke following a procedure.
Post-Care Instructions: WOUND CARE:\\nDo NOT submerge wound in a bath, swimming pool, or hot tub for at least 1 week or for as long as there is an open wound. Gently cleanse the site daily with mild soap and water. Be careful NOT to allow a forceful stream of water to hit the biopsy site. After cleaning/showering, apply a thin layer of petrolatum ointment or Aquaphor in the wound followed by an adhesive bandage. Continue this process daily until healed. \\n\\nBLEEDING:\\nIf you develop persistent bleeding, apply firm and steady pressure over the dressing with gauze for 15 minutes. If bleeding persists, reapply pressure with an ice pack over the gauze for 15 minutes. NEVER APPLY ICE DIRECTLY TO THE SKIN. Do NOT peek under the gauze during these 15 minutes of pressure.  Call our office at [phone] if you cannot get the bleeding to stop. \\n\\nINFECTION:\\nSigns of infection may include increasing rather than decreasing pain (after the first few days), increasing redness/swelling/heat, draining pus, pink/red streaks around the wound, and fever or chills.  Please call our office immediately at [phone] if infection is suspected. It is normal to have some mild redness on or around the wound edges; this will lighten day by day and will become less tender.\\n\\nPAIN:\\nPain is usually minimal, but if needed you may take acetaminophen (Tylenol) every four hours as needed. Applying an ice pack over the dressing for 15-20 minutes every 2-3 hours will relieve swelling, lessen pain, and help minimize bruising. NEVER APPLY ICE DIRECTLY TO THE SKIN. Avoid ibuprofen (Advil, Motrin) and naproxen (Aleve) for the first 48 hours as these can increase bleeding.\\n\\nSWELLIG AND BRUISING:\\nSwelling and bruising are common and temporary, usually lasting 1 - 2 weeks. It is more common in areas treated around the eyes, hands, and feet. In the days following the procedure, swelling and bruising can be minimized by keeping the affected areas elevated when possible, reducing salty foods, and applying ice packs over the dressing for 15-20 minutes every 2-3 hours. NEVER APPLY ICE DIRECTLY TO THE SKIN.\\n\\nITCHING:\\nItchiness on and around the wound is very common and can last several days to weeks after surgery. Mild itch is normal as the wound is healing. \\n\\nNERVE CHANGES:\\nNumbness is usually temporary, but it may last for several weeks to months. You may also experience sharp pains at the wound sight as it heals.  Mild pain is normal and will gradually improve with time.\\n \\nNO SMOKING:\\nSmoking will delay the healing process. If you smoke, we recommend trying to quit or at minimum reduce how much you smoke following a procedure.
Wound Care: Petrolatum
Consent: - Verbal and written consent was obtained and risks were reviewed prior to procedure today. \\n- Risks discussed include but are not limited to scarring, infection, bleeding, scabbing, incomplete removal, nerve damage, pain, and allergy to anesthesia.
Biopsy Type: H and E
Hemostasis: Drysol
Electrodesiccation Text: The wound bed was treated with electrodesiccation after the biopsy was performed.
Dressing: bandage
Biopsy Method: Dermablade
Anesthesia Volume In Cc (Will Not Render If 0): 0.5

## 2019-09-12 NOTE — PROCEDURE: PATHOLOGY BILLING
Rendering Text In Billing: The slides will be read by [employer] and reported in the attached document. Rendering Text In Billing: The slides will be read by [employer] and reported in the attached document.

## 2019-09-12 NOTE — PROCEDURE: PATHOLOGY BILLING
Immunohistochemistry ([id number] and [id number]) billing is not performed here. Please use the Immunohistochemistry Stain Billing plan to accomplish this.

## 2020-01-08 ENCOUNTER — APPOINTMENT (OUTPATIENT)
Age: 60
Setting detail: DERMATOLOGY
End: 2020-01-08

## 2020-01-08 VITALS — RESPIRATION RATE: 16 BRPM | HEIGHT: 69 IN | WEIGHT: 208 LBS

## 2020-01-08 DIAGNOSIS — L72.8 OTHER FOLLICULAR CYSTS OF THE SKIN AND SUBCUTANEOUS TISSUE: ICD-10-CM

## 2020-01-08 DIAGNOSIS — L90.5 SCAR CONDITIONS AND FIBROSIS OF SKIN: ICD-10-CM

## 2020-01-08 PROCEDURE — OTHER COUNSELING: OTHER

## 2020-01-08 PROCEDURE — 99213 OFFICE O/P EST LOW 20 MIN: CPT

## 2020-01-08 ASSESSMENT — LOCATION DETAILED DESCRIPTION DERM
LOCATION DETAILED: PERIUMBILICAL SKIN
LOCATION DETAILED: RIGHT CENTRAL MALAR CHEEK
LOCATION DETAILED: EPIGASTRIC SKIN

## 2020-01-08 ASSESSMENT — LOCATION SIMPLE DESCRIPTION DERM
LOCATION SIMPLE: RIGHT CHEEK
LOCATION SIMPLE: ABDOMEN

## 2020-01-08 ASSESSMENT — LOCATION ZONE DERM
LOCATION ZONE: TRUNK
LOCATION ZONE: FACE

## 2020-01-08 NOTE — PROCEDURE: COUNSELING
Detail Level: Detailed
Patient Specific Counseling (Will Not Stick From Patient To Patient): Extracted as a courtesy today to confirm dilated pore/cyst with keratin plug that was easily removed with comedone extractor,
Patient Specific Counseling (Will Not Stick From Patient To Patient): Advised that there are no obvious signs of recurrence at this time. Advised that pigmentation in the left abdomen lesion appears to be extending from the adjacent nevus. Photo was taken and will monitor at future full body skin exams. Follow-up annually for full body skin exam.

## 2020-01-08 NOTE — HPI: SKIN LESION
How Severe Is Your Skin Lesion?: mild
Has Your Skin Lesion Been Treated?: not been treated
Is This A New Presentation, Or A Follow-Up?: Skin Lesion
Additional History: He reports being able to feel a little bump.

## 2020-02-24 ENCOUNTER — HEALTH MAINTENANCE LETTER (OUTPATIENT)
Age: 60
End: 2020-02-24

## 2020-05-13 DIAGNOSIS — E66.09 CLASS 1 OBESITY DUE TO EXCESS CALORIES WITH SERIOUS COMORBIDITY AND BODY MASS INDEX (BMI) OF 30.0 TO 30.9 IN ADULT: ICD-10-CM

## 2020-05-13 DIAGNOSIS — G47.33 OSA (OBSTRUCTIVE SLEEP APNEA): ICD-10-CM

## 2020-05-13 DIAGNOSIS — F51.04 PSYCHOPHYSIOLOGIC INSOMNIA: ICD-10-CM

## 2020-05-13 DIAGNOSIS — E66.811 CLASS 1 OBESITY DUE TO EXCESS CALORIES WITH SERIOUS COMORBIDITY AND BODY MASS INDEX (BMI) OF 30.0 TO 30.9 IN ADULT: ICD-10-CM

## 2020-12-13 ENCOUNTER — HEALTH MAINTENANCE LETTER (OUTPATIENT)
Age: 60
End: 2020-12-13

## 2021-04-17 ENCOUNTER — HEALTH MAINTENANCE LETTER (OUTPATIENT)
Age: 61
End: 2021-04-17

## 2021-09-26 ENCOUNTER — HEALTH MAINTENANCE LETTER (OUTPATIENT)
Age: 61
End: 2021-09-26

## 2021-12-20 ENCOUNTER — VIRTUAL VISIT (OUTPATIENT)
Dept: SLEEP MEDICINE | Facility: CLINIC | Age: 61
End: 2021-12-20
Payer: COMMERCIAL

## 2021-12-20 VITALS — WEIGHT: 210 LBS | BODY MASS INDEX: 31.1 KG/M2 | HEIGHT: 69 IN

## 2021-12-20 DIAGNOSIS — F51.04 PSYCHOPHYSIOLOGIC INSOMNIA: ICD-10-CM

## 2021-12-20 DIAGNOSIS — G47.33 OSA (OBSTRUCTIVE SLEEP APNEA): Primary | ICD-10-CM

## 2021-12-20 PROCEDURE — 99212 OFFICE O/P EST SF 10 MIN: CPT | Mod: 95 | Performed by: PHYSICIAN ASSISTANT

## 2021-12-20 RX ORDER — OXYCODONE AND ACETAMINOPHEN 5; 325 MG/1; MG/1
325 TABLET ORAL
COMMUNITY
Start: 2021-11-12

## 2021-12-20 RX ORDER — ASPIRIN 81 MG/1
81 TABLET, CHEWABLE ORAL
COMMUNITY

## 2021-12-20 RX ORDER — BUPROPION HYDROCHLORIDE 300 MG/1
300 TABLET ORAL
COMMUNITY

## 2021-12-20 RX ORDER — TAMSULOSIN HYDROCHLORIDE 0.4 MG/1
0.4 CAPSULE ORAL
COMMUNITY
Start: 2021-08-05

## 2021-12-20 RX ORDER — METHOCARBAMOL 750 MG/1
750 TABLET, FILM COATED ORAL
COMMUNITY
Start: 2021-05-26

## 2021-12-20 RX ORDER — FENOFIBRATE 54 MG/1
54 TABLET ORAL
COMMUNITY
Start: 2021-08-05

## 2021-12-20 RX ORDER — PROPRANOLOL HYDROCHLORIDE 40 MG/1
40 TABLET ORAL
COMMUNITY
Start: 2021-08-05

## 2021-12-20 RX ORDER — ATORVASTATIN CALCIUM 40 MG/1
40 TABLET, FILM COATED ORAL
COMMUNITY
Start: 2021-12-02

## 2021-12-20 RX ORDER — ATORVASTATIN CALCIUM 40 MG/1
40 TABLET, FILM COATED ORAL
COMMUNITY
Start: 2021-08-05

## 2021-12-20 RX ORDER — CLONAZEPAM 1 MG/1
1 TABLET ORAL
COMMUNITY

## 2021-12-20 RX ORDER — LORATADINE 10 MG/1
1 TABLET ORAL DAILY
COMMUNITY
Start: 2020-10-13

## 2021-12-20 ASSESSMENT — SLEEP AND FATIGUE QUESTIONNAIRES
HOW LIKELY ARE YOU TO NOD OFF OR FALL ASLEEP WHILE SITTING QUIETLY AFTER LUNCH WITHOUT ALCOHOL: WOULD NEVER DOZE
HOW LIKELY ARE YOU TO NOD OFF OR FALL ASLEEP WHILE SITTING AND TALKING TO SOMEONE: WOULD NEVER DOZE
HOW LIKELY ARE YOU TO NOD OFF OR FALL ASLEEP WHILE LYING DOWN TO REST IN THE AFTERNOON WHEN CIRCUMSTANCES PERMIT: WOULD NEVER DOZE
HOW LIKELY ARE YOU TO NOD OFF OR FALL ASLEEP WHEN YOU ARE A PASSENGER IN A CAR FOR AN HOUR WITHOUT A BREAK: SLIGHT CHANCE OF DOZING
HOW LIKELY ARE YOU TO NOD OFF OR FALL ASLEEP WHILE SITTING AND READING: WOULD NEVER DOZE
HOW LIKELY ARE YOU TO NOD OFF OR FALL ASLEEP IN A CAR, WHILE STOPPED FOR A FEW MINUTES IN TRAFFIC: WOULD NEVER DOZE
HOW LIKELY ARE YOU TO NOD OFF OR FALL ASLEEP WHILE SITTING INACTIVE IN A PUBLIC PLACE: SLIGHT CHANCE OF DOZING
HOW LIKELY ARE YOU TO NOD OFF OR FALL ASLEEP WHILE WATCHING TV: SLIGHT CHANCE OF DOZING

## 2021-12-20 ASSESSMENT — MIFFLIN-ST. JEOR: SCORE: 1757.93

## 2021-12-20 NOTE — PROGRESS NOTES
Deandre is a 61 year old who is being evaluated via a billable video visit.      How would you like to obtain your AVS? MyChart  If the video visit is dropped, the invitation should be resent by: Text to cell phone: [phone]  Will anyone else be joining your video visit? No    Video Start Time: 2:37 PM  Video-Visit Details    Type of service:  Video Visit    Video End Time:2:47 PM    Originating Location (pt. Location): Home    Distant Location (provider location):  [facility]     Platform used for Video Visit: [employer]     Obstructive Sleep Apnea - PAP Follow-Up Visit:    Chief Complaint   Patient presents with     CPAP Follow Up       Deandre Tao comes in today for follow-up of their mild sleep apnea, managed with CPAP.      Deandre initially had a sleep study February 17, 2008 and then a CPAP titration study 2/26/2008 for snoring, morning headaches, restlessness, observed apneas. Weight at the time of study was 206#.    Overall, the patient rates his experience with PAP as 10 (0 poor, 10 great). The mask is comfortable. The mask is not leaking.  He is not snoring with the mask on. He is not having gasp arousals. He is not having any oral or nasal dryness. The pressure settings are comfortable.    His PAP interface is Nasal Mask.    Bedtime is typically 10 PM. Usually it takes about 30-60 minutes to fall asleep with the mask on. Wake time is typically 5:45 AM.  Patient is using PAP therapy 7 hours per night. The patient is usually getting 7 hours of sleep per night.    Total score - [place]: 3 (12/20/2021 12:40 PM)      MARIAM Total Score: 3    Respironics  CPAP 6 cmH2O 30 day usage data:  87% of days with > 4 hours of use. 0/30 days with no use.   Average use 7 hours and 15 minutes per day.   Average leak 0.4 LPM.  Average % of night in large leak 0%.    AHI 1 events per hour.       Past medical/surgical history, family history, social history, medications and allergies were  "reviewed.      Problem List:  Patient Active Problem List    Diagnosis Date Noted     Coronary artery disease involving native coronary artery of native heart 10/04/2016     Priority: Medium     S/p stentx4 [facility]       Psychophysiologic insomnia 09/02/2015     Priority: Medium     GIANLUCA (obstructive sleep apnea)- mild (AHI 6) 09/01/2015     Priority: Medium     Sleep study [facility] 2/2008 (#206)- AHI 6.1, RDI 17.1, low 02 89%, PLMAI 16.2  Sleep study [facility] 2/2008 (#206)-  Resolution of OSAS with low level CPAP. No supine REM seen. PLMI 21.9, PLMAI 8.6       BPH (benign prostatic hypertrophy) 09/01/2015     Priority: Medium     Hyperlipidemia 09/01/2015     Priority: Medium     Anxiety 09/01/2015     Priority: Medium     Depression 09/01/2015     Priority: Medium     CVD (cerebrovascular disease) 11/02/2010     Priority: Medium     TIA 2010 [facility]:  flushed, light headed, numbness in left foot. S/p PFO closure in 2010 due to TIA.         Class 1 obesity due to excess calories with serious comorbidity in adult 09/01/2015     Priority: Low        Ht 1.753 m (5' 9\")   Wt 95.3 kg (210 lb)   BMI 31.01 kg/m      Impression/Plan:    Mild obstructive sleep apnea-  Good CPAP compliance and AHI is well controlled on CPAP 6 cm/H20.  Continue current treatment.   Comprehensive DME order placed.    Deandre Tao will follow up in about 1-2 year(s).     Conor Desai PA-C  "

## 2021-12-20 NOTE — PATIENT INSTRUCTIONS
Your Body mass index is 31.01 kg/m .  Weight management is a personal decision.  If you are interested in exploring weight loss strategies, the following discussion covers the approaches that may be successful. Body mass index (BMI) is one way to tell whether you are at a healthy weight, overweight, or obese. It measures your weight in relation to your height.  A BMI of 18.5 to 24.9 is in the healthy range. A person with a BMI of 25 to 29.9 is considered overweight, and someone with a BMI of 30 or greater is considered obese. More than two-thirds of American adults are considered overweight or obese.  Being overweight or obese increases the risk for further weight gain. Excess weight may lead to heart disease and diabetes.  Creating and following plans for healthy eating and physical activity may help you improve your health.  Weight control is part of healthy lifestyle and includes exercise, emotional health, and healthy eating habits. Careful eating habits lifelong are the mainstay of weight control. Though there are significant health benefits from weight loss, long-term weight loss with diet alone may be very difficult to achieve- studies show long-term success with dietary management in less than 10% of people. Attaining a healthy weight may be especially difficult to achieve in those with severe obesity. In some cases, medications, devices and surgical management might be considered.  What can you do?  If you are overweight or obese and are interested in methods for weight loss, you should discuss this with your provider.     Consider reducing daily calorie intake by 500 calories.     Keep a food journal.     Avoiding skipping meals, consider cutting portions instead.    Diet combined with exercise helps maintain muscle while optimizing fat loss. Strength training is particularly important for building and maintaining muscle mass. Exercise helps reduce stress, increase energy, and improves fitness.  Increasing exercise without diet control, however, may not burn enough calories to loose weight.       Start walking three days a week 10-20 minutes at a time    Work towards walking thirty minutes five days a week     Eventually, increase the speed of your walking for 1-2 minutes at time    In addition, we recommend that you review healthy lifestyles and methods for weight loss available through the National Institutes of Health patient information sites:  http://win.niddk.nih.gov/publications/index.htm    And look into health and wellness programs that may be available through your health insurance provider, employer, local community center, or mata club.

## 2021-12-21 NOTE — NURSING NOTE
DME order automatically route to [facility]. Return in about 1 year reminder created and to be sent via netomat.       CHANCE Pimentel  [facility] [place]

## 2022-03-18 NOTE — PROCEDURE: PATHOLOGY BILLING
Improved on medications, monitored by specialist. No acute findings meriting change in the plan Immunohistochemistry ([id number] and [id number]) billing is not performed here. Please use the Immunohistochemistry Stain Billing plan to accomplish this. Immunohistochemistry ([id number] and [id number]) billing is not performed here. Please use the Immunohistochemistry Stain Billing plan to accomplish this.

## 2022-05-08 ENCOUNTER — HEALTH MAINTENANCE LETTER (OUTPATIENT)
Age: 62
End: 2022-05-08

## 2022-12-14 ENCOUNTER — TELEPHONE (OUTPATIENT)
Dept: SLEEP MEDICINE | Facility: CLINIC | Age: 62
End: 2022-12-14

## 2022-12-14 DIAGNOSIS — G47.33 OSA (OBSTRUCTIVE SLEEP APNEA): Primary | ICD-10-CM

## 2022-12-14 NOTE — TELEPHONE ENCOUNTER
Reason for Call: Request for an order or referral:    Order or referral being requested: Patient Deandre Wilburn is calling because he needs CPap supplies, he said last time, Dr. Savage helped him with this.     Patient is needing:    Tubing, Mask, filters.     Date needed: ASAP, but patient said he is okay for a bit.     Has the patient been seen by the PCP for this problem? Yes    Additional comments: None     Phone number Patient can be reached at:  [phone]    Best Time:  Any     Can we leave a detailed message on this number?  Yes    Call taken on 12/14/2022 at 3:53 PM by Maritza Esqueda

## 2023-01-17 ASSESSMENT — SLEEP AND FATIGUE QUESTIONNAIRES
HOW LIKELY ARE YOU TO NOD OFF OR FALL ASLEEP WHILE SITTING AND TALKING TO SOMEONE: WOULD NEVER DOZE
HOW LIKELY ARE YOU TO NOD OFF OR FALL ASLEEP IN A CAR, WHILE STOPPED FOR A FEW MINUTES IN TRAFFIC: WOULD NEVER DOZE
HOW LIKELY ARE YOU TO NOD OFF OR FALL ASLEEP WHILE SITTING QUIETLY AFTER LUNCH WITHOUT ALCOHOL: WOULD NEVER DOZE
HOW LIKELY ARE YOU TO NOD OFF OR FALL ASLEEP WHILE WATCHING TV: SLIGHT CHANCE OF DOZING
HOW LIKELY ARE YOU TO NOD OFF OR FALL ASLEEP WHILE SITTING AND READING: WOULD NEVER DOZE
HOW LIKELY ARE YOU TO NOD OFF OR FALL ASLEEP WHEN YOU ARE A PASSENGER IN A CAR FOR AN HOUR WITHOUT A BREAK: SLIGHT CHANCE OF DOZING
HOW LIKELY ARE YOU TO NOD OFF OR FALL ASLEEP WHILE SITTING INACTIVE IN A PUBLIC PLACE: WOULD NEVER DOZE
HOW LIKELY ARE YOU TO NOD OFF OR FALL ASLEEP WHILE LYING DOWN TO REST IN THE AFTERNOON WHEN CIRCUMSTANCES PERMIT: WOULD NEVER DOZE

## 2023-01-18 ENCOUNTER — VIRTUAL VISIT (OUTPATIENT)
Dept: SLEEP MEDICINE | Facility: CLINIC | Age: 63
End: 2023-01-18
Payer: COMMERCIAL

## 2023-01-18 VITALS
BODY MASS INDEX: 32.14 KG/M2 | SYSTOLIC BLOOD PRESSURE: 130 MMHG | DIASTOLIC BLOOD PRESSURE: 82 MMHG | WEIGHT: 217 LBS | HEIGHT: 69 IN

## 2023-01-18 DIAGNOSIS — G47.33 OSA (OBSTRUCTIVE SLEEP APNEA): Primary | ICD-10-CM

## 2023-01-18 PROCEDURE — 99213 OFFICE O/P EST LOW 20 MIN: CPT | Mod: 95 | Performed by: PHYSICIAN ASSISTANT

## 2023-01-18 ASSESSMENT — PAIN SCALES - GENERAL: PAINLEVEL: NO PAIN (0)

## 2023-01-18 NOTE — PROGRESS NOTES
Deandre is a 62 year old who is being evaluated via a billable video visit.      How would you like to obtain your AVS? MyChart  If the video visit is dropped, the invitation should be resent by: Send to e-mail at: [email or web address]  Will anyone else be joining your video visit? No  Kavitha Nuñez    Video-Visit Details    Type of service:  Video Visit     Originating Location (pt. Location): Home    Distant Location (provider location):  On-site  Platform used for Video Visit: [facility]     Sleep Apnea - Follow-up Visit:    Impression/Plan:  Mild obstructive sleep apnea-  Good CPAP compliance and AHI is well controlled on CPAP 6 cm/H20. Daytime symptoms are improved.   Continue current treatment.   Comprehensive DME order placed.    15 minutes spent on day of encounter doing chart review,  history and exam, counseling, coordinating plan of care, documentation and further activities as noted above.      Conor Desai PA-C    History of Present Illness:  Chief Complaint   Patient presents with     Video Visit     Follow up        Deandre Tao presents for follow-up of their mild sleep apnea, managed with CPAP.     Deandre is here for follow up of his mild GIANLUCA (AHI 7) and PAP usage for a DOT visit.      Deandre initially had a sleep study February 17, 2008 and then a CPAP titration study 2/26/2008 for snoring, morning headaches, restlessness, observed apneas. Weight at the time of study was 206#.    Do you use a CPAP Machine at home: Yes  Overall, on a scale of 0-10 how would you rate your CPAP (0 poor, 10 great): 9    What type of mask do you use: Nasal Mask  Is your mask comfortable: Yes  If not, why:      Is your mask leaking: No  If yes, where do you feel it:    How many night per week does the mask leak (0-7):      Do you notice snoring with mask on: No  Do you notice gasping arousals with mask on: No  Are you having significant oral or nasal dryness: No  Is the pressure setting comfortable: Yes  If not, why:      What is  your typical bedtime: 11 pm  How long does it take you to go to sleep on PAP therapy: 1/2 hour  What time do you typically get out of bed for the day: 8 am  How many hours on average per night are you using PAP therapy: 8 hours  How many hours are you sleeping per night: 8 hours  Do you feel well rested in the morning: Yes    Respironics  CPAP 6 cmH2O 30 day usage data:  83% of days with > 4 hours of use. 2/30 days with no use.   Average use 6 hours and 45 minutes per day.   Average leak 0 LPM.    AHI 1.5 events per hour.         EPWORTH SLEEPINESS SCALE      Dexter Sleepiness Scale ( CONNIE Whaley  [id number]<br>ESS - [place]/English - Final version - 21 Nov 07 - [facility] [place].) 1/17/2023   Sitting and reading Would never doze   Watching TV Slight chance of dozing   Sitting, inactive in a public place (e.g. a theatre or a meeting) Would never doze   As a passenger in a car for an hour without a break Slight chance of dozing   Lying down to rest in the afternoon when circumstances permit Would never doze   Sitting and talking to someone Would never doze   Sitting quietly after a lunch without alcohol Would never doze   In a car, while stopped for a few minutes in traffic Would never doze   Dexter Score (MC) 2   Dexter Score (Sleep) 2       INSOMNIA SEVERITY INDEX (MARIAM)      Insomnia Severity Index (MARIAM) 1/17/2023   Difficulty falling asleep 2   Difficulty staying asleep 0   Problems waking up too early 0   How SATISFIED/DISSATISFIED are you with your CURRENT sleep pattern? 1   How NOTICEABLE to others do you think your sleep problem is in terms of impairing the quality of your life? 1   How WORRIED/DISTRESSED are you about your current sleep problem? 0   To what extent do you consider your sleep problem to INTERFERE with your daily functioning (e.g. daytime fatigue, mood, ability to function at work/daily chores, concentration, memory, mood, etc.) CURRENTLY? 0   MARIAM Total Score 4       Guidelines for  "Scoring/Interpretation:  Total score categories:  0-7 = No clinically significant insomnia   8-14 = Subthreshold insomnia   15-21 = Clinical insomnia (moderate severity)  22-28 = Clinical insomnia (severe)  Used via courtesy of www.myhealth.va.gov with permission from Wes King PhD., [facility]        Past medical/surgical history, family history, social history, medications and allergies were reviewed.        Problem List:  Patient Active Problem List    Diagnosis Date Noted     Coronary artery disease involving native coronary artery of native heart 10/04/2016     Priority: Medium     S/p stentx4 [facility]       Psychophysiologic insomnia 09/02/2015     Priority: Medium     GIANLUCA (obstructive sleep apnea)- mild (AHI 6) 09/01/2015     Priority: Medium     Sleep study [facility] 2/2008 (#206)- AHI 6.1, RDI 17.1, low 02 89%, PLMAI 16.2  Sleep study [facility] 2/2008 (#206)-  Resolution of OSAS with low level CPAP. No supine REM seen. PLMI 21.9, PLMAI 8.6       BPH (benign prostatic hypertrophy) 09/01/2015     Priority: Medium     Hyperlipidemia 09/01/2015     Priority: Medium     Anxiety 09/01/2015     Priority: Medium     Depression 09/01/2015     Priority: Medium     CVD (cerebrovascular disease) 11/02/2010     Priority: Medium     TIA 2010 [facility]:  flushed, light headed, numbness in left foot. S/p PFO closure in 2010 due to TIA.         Class 1 obesity due to excess calories with serious comorbidity in adult 09/01/2015     Priority: Low        /82   Ht 1.753 m (5' 9\")   Wt 98.4 kg (217 lb)   BMI 32.05 kg/m    "

## 2023-04-07 ENCOUNTER — TELEPHONE (OUTPATIENT)
Dept: SLEEP MEDICINE | Facility: CLINIC | Age: 63
End: 2023-04-07
Payer: COMMERCIAL

## 2023-04-07 DIAGNOSIS — G47.33 OSA (OBSTRUCTIVE SLEEP APNEA): Chronic | ICD-10-CM

## 2023-04-07 DIAGNOSIS — E66.09 CLASS 1 OBESITY DUE TO EXCESS CALORIES WITH SERIOUS COMORBIDITY IN ADULT: Chronic | ICD-10-CM

## 2023-04-07 DIAGNOSIS — F51.04 PSYCHOPHYSIOLOGIC INSOMNIA: Primary | ICD-10-CM

## 2023-04-07 DIAGNOSIS — E66.811 CLASS 1 OBESITY DUE TO EXCESS CALORIES WITH SERIOUS COMORBIDITY IN ADULT: Chronic | ICD-10-CM

## 2023-04-23 ENCOUNTER — HEALTH MAINTENANCE LETTER (OUTPATIENT)
Age: 63
End: 2023-04-23

## 2024-02-11 ENCOUNTER — HEALTH MAINTENANCE LETTER (OUTPATIENT)
Age: 64
End: 2024-02-11

## 2024-04-09 DIAGNOSIS — G47.33 OSA (OBSTRUCTIVE SLEEP APNEA): Chronic | ICD-10-CM

## 2024-04-09 DIAGNOSIS — F51.04 PSYCHOPHYSIOLOGIC INSOMNIA: Primary | ICD-10-CM

## 2024-04-09 DIAGNOSIS — E66.811 CLASS 1 OBESITY DUE TO EXCESS CALORIES WITH SERIOUS COMORBIDITY AND BODY MASS INDEX (BMI) OF 30.0 TO 30.9 IN ADULT: Chronic | ICD-10-CM

## 2024-04-09 DIAGNOSIS — E66.09 CLASS 1 OBESITY DUE TO EXCESS CALORIES WITH SERIOUS COMORBIDITY AND BODY MASS INDEX (BMI) OF 30.0 TO 30.9 IN ADULT: Chronic | ICD-10-CM

## 2025-03-08 ENCOUNTER — HEALTH MAINTENANCE LETTER (OUTPATIENT)
Age: 65
End: 2025-03-08

## 2025-05-28 ENCOUNTER — DOCUMENTATION ONLY (OUTPATIENT)
Dept: SLEEP MEDICINE | Facility: CLINIC | Age: 65
End: 2025-05-28
Payer: COMMERCIAL

## 2025-05-28 DIAGNOSIS — E66.09 CLASS 1 OBESITY DUE TO EXCESS CALORIES WITH SERIOUS COMORBIDITY AND BODY MASS INDEX (BMI) OF 30.0 TO 30.9 IN ADULT: Chronic | ICD-10-CM

## 2025-05-28 DIAGNOSIS — G47.33 OSA (OBSTRUCTIVE SLEEP APNEA): Chronic | ICD-10-CM

## 2025-05-28 DIAGNOSIS — E66.811 CLASS 1 OBESITY DUE TO EXCESS CALORIES WITH SERIOUS COMORBIDITY AND BODY MASS INDEX (BMI) OF 30.0 TO 30.9 IN ADULT: Chronic | ICD-10-CM

## 2025-05-28 DIAGNOSIS — F51.04 PSYCHOPHYSIOLOGIC INSOMNIA: Primary | ICD-10-CM
